# Patient Record
Sex: MALE | Race: WHITE | NOT HISPANIC OR LATINO | URBAN - METROPOLITAN AREA
[De-identification: names, ages, dates, MRNs, and addresses within clinical notes are randomized per-mention and may not be internally consistent; named-entity substitution may affect disease eponyms.]

---

## 2018-05-19 ENCOUNTER — OFFICE VISIT - RIVER FALLS (OUTPATIENT)
Dept: FAMILY MEDICINE | Facility: CLINIC | Age: 38
End: 2018-05-19

## 2018-05-19 ASSESSMENT — MIFFLIN-ST. JEOR: SCORE: 1683.27

## 2022-02-11 VITALS
HEIGHT: 67 IN | SYSTOLIC BLOOD PRESSURE: 170 MMHG | TEMPERATURE: 97.8 F | HEART RATE: 92 BPM | BODY MASS INDEX: 28.53 KG/M2 | DIASTOLIC BLOOD PRESSURE: 100 MMHG | OXYGEN SATURATION: 97 % | WEIGHT: 181.8 LBS

## 2022-02-16 NOTE — PROGRESS NOTES
Patient:   JESSY SEYMOUR            MRN: [id number]            FIN: [id number]               Age:   37 years     Sex:  Male     :  1980   Associated Diagnoses:   Dog bite of left hand   Author:   Josefa Trinidad      Visit Information      Date of Service: 2018 02:26 pm  Performing Location: [facility]  Encounter#: [id number]      Primary Care Provider (PCP):  Rj Degroot MD    NPI# [id number]      Referring Provider:  Josefa Trinidad    NPI# [id number]      Chief Complaint   2018 2:36 PM CDT    Patient presents for dog bite on L hand-couple small bites about 30 minutes ago        History of Present Illness   confirmed chief complaint with patient  He was breaking up a fight between two of his dogs, both have been vaccinated for rabies  He is right hand dominant but isn't using it much because he is recovering from right elbow surgery, so he used his left arm andt the dogs bit him a few times.  In review of the chart his tetanus status is UTD         Review of Systems             Health Status   Allergies:    Allergic Reactions (Selected)  Severity Not Documented  Bee Stings (Anaphylaxis)   Medications:  (Selected)   Prescriptions  Prescribed  Auvi-Q 0.3 mg injectable kit: ( 0.3 mg ), im, once, # 1 EA, 11 Refill(s), Type: Soft Stop, Pharmacy: [facility] [id number], If not covered by insurance, patient is to go back to regular epi-pens, 0.3 mg im once  EpiPen 2-Maximo 0.3 mg injectable kit: See Instructions, Instructions: Use as needed for bee stings, # 2 EA, 6 Refill(s), Type: Soft Stop, Pharmacy: [facility] [id number], Fill if patient doesn't want to  the Auvi-Q. Thanks, Use as needed for bee stings   Problem list:    No problem items selected or recorded.      Histories   Past Medical History:    No active or resolved past medical history items have been selected or recorded.   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  Comments:  5/3/2018 9:25 AM - Smita Starks.  hernia surgery.   Social History:        Alcohol Assessment            Current, Daily      Tobacco Assessment            Past      Employment and Education Assessment            Employed, Work/School description: self employed..      Home and Environment Assessment            Marital status: .  Spouse/Partner name: Sun.  Lives with Spouse.      Exercise and Physical Activity Assessment            Exercise frequency: Daily.  Exercise type: Walking.        Physical Examination   Vital Signs   5/19/2018 2:36 PM CDT Temperature Tympanic 97.8 DegF  LOW    Peripheral Pulse Rate 92 bpm    HR Method Electronic    Systolic Blood Pressure 170 mmHg  HI    Diastolic Blood Pressure 100 mmHg  HI    Mean Arterial Pressure 123 mmHg    BP Site Right arm    BP Method Manual    Oxygen Saturation 97 %      Measurements from flowsheet : Measurements   5/19/2018 2:36 PM CDT Height Measured - Standard 67 in    Weight Measured - Standard 181.8 lb    BSA 1.97 m2    Body Mass Index 28.47 kg/m2  HI      General:  Alert and oriented, he has approx 6 puncture wounds on the left hand on fingers and the posterior thenar eminence is the larges, approx 3mm laceration.  Entire hand was soaked in  for 15 min then cleaned off with 4x4. I explored the largest wound with a sterile gracie and so no foreign body or tendon. He has full ROM of all fingers and risk and so I reassured him I do not see an indicator of tendon injury which he was worried about.       Impression and Plan   Diagnosis     Dog bite of left hand ([id number] S61.452A).     Plan:  wounds were cleaned and dressed with 4x4 after applied single steristreip to the lesion on the dorsal aspect of the thenar eminence because it did bleed a little bit. It was wrapped and he should  leavet this on till tomorrow then soak in Epsom salts warm water 15 min bid for next 3 days. It is ok if the steri strip falls off, watch for infection and rtc if not improving.    Patient Instructions:       Counseled: Patient, Regarding diagnosis, Regarding treatment, Regarding medications, Verbalized understanding, Counseled on symptomatic management. Return to clinic for re evaluation if worsening, simply not improving, or failure to resolve.   .